# Patient Record
Sex: MALE | Race: WHITE
[De-identification: names, ages, dates, MRNs, and addresses within clinical notes are randomized per-mention and may not be internally consistent; named-entity substitution may affect disease eponyms.]

---

## 2018-04-25 ENCOUNTER — HOSPITAL ENCOUNTER (OUTPATIENT)
Dept: HOSPITAL 58 - OUTPT | Age: 78
End: 2018-04-25
Attending: OTOLARYNGOLOGY

## 2018-04-25 DIAGNOSIS — R42: Primary | ICD-10-CM

## 2018-04-30 ENCOUNTER — HOSPITAL ENCOUNTER (OUTPATIENT)
Dept: HOSPITAL 58 - RHC-LAB | Age: 78
Discharge: HOME | End: 2018-04-30
Attending: INTERNAL MEDICINE

## 2018-04-30 DIAGNOSIS — F33.1: ICD-10-CM

## 2018-04-30 DIAGNOSIS — E78.5: ICD-10-CM

## 2018-04-30 DIAGNOSIS — I10: ICD-10-CM

## 2018-04-30 DIAGNOSIS — E11.9: Primary | ICD-10-CM

## 2018-04-30 DIAGNOSIS — K21.9: ICD-10-CM

## 2018-04-30 PROCEDURE — 80061 LIPID PANEL: CPT

## 2018-04-30 PROCEDURE — 80053 COMPREHEN METABOLIC PANEL: CPT

## 2018-04-30 PROCEDURE — 84443 ASSAY THYROID STIM HORMONE: CPT

## 2018-04-30 PROCEDURE — 83036 HEMOGLOBIN GLYCOSYLATED A1C: CPT

## 2018-04-30 PROCEDURE — 85025 COMPLETE CBC W/AUTO DIFF WBC: CPT

## 2018-04-30 PROCEDURE — 36415 COLL VENOUS BLD VENIPUNCTURE: CPT

## 2018-05-15 ENCOUNTER — HOSPITAL ENCOUNTER (OUTPATIENT)
Dept: HOSPITAL 58 - FCC-LAB | Age: 78
Discharge: HOME | End: 2018-05-15
Attending: INTERNAL MEDICINE

## 2018-05-15 VITALS — BODY MASS INDEX: 20.7 KG/M2

## 2018-05-15 DIAGNOSIS — E87.5: ICD-10-CM

## 2018-05-15 DIAGNOSIS — E11.9: ICD-10-CM

## 2018-05-15 DIAGNOSIS — E78.5: Primary | ICD-10-CM

## 2018-05-15 DIAGNOSIS — D64.9: ICD-10-CM

## 2018-05-15 PROCEDURE — 83550 IRON BINDING TEST: CPT

## 2018-05-15 PROCEDURE — 80053 COMPREHEN METABOLIC PANEL: CPT

## 2018-05-15 PROCEDURE — 84443 ASSAY THYROID STIM HORMONE: CPT

## 2018-05-15 PROCEDURE — 82607 VITAMIN B-12: CPT

## 2018-05-15 PROCEDURE — 85045 AUTOMATED RETICULOCYTE COUNT: CPT

## 2018-05-15 PROCEDURE — 82746 ASSAY OF FOLIC ACID SERUM: CPT

## 2018-05-15 PROCEDURE — 84436 ASSAY OF TOTAL THYROXINE: CPT

## 2018-05-15 PROCEDURE — 84479 ASSAY OF THYROID (T3 OR T4): CPT

## 2018-05-15 PROCEDURE — 82728 ASSAY OF FERRITIN: CPT

## 2018-05-15 PROCEDURE — 36415 COLL VENOUS BLD VENIPUNCTURE: CPT

## 2018-05-15 PROCEDURE — 83540 ASSAY OF IRON: CPT

## 2018-05-15 PROCEDURE — 84466 ASSAY OF TRANSFERRIN: CPT

## 2018-05-15 PROCEDURE — 85025 COMPLETE CBC W/AUTO DIFF WBC: CPT

## 2018-05-16 ENCOUNTER — HOSPITAL ENCOUNTER (INPATIENT)
Dept: HOSPITAL 58 - MEDSURG B | Age: 78
LOS: 2 days | Discharge: HOME | DRG: 641 | End: 2018-05-18
Attending: INTERNAL MEDICINE | Admitting: INTERNAL MEDICINE

## 2018-05-16 VITALS — BODY MASS INDEX: 20.7 KG/M2

## 2018-05-16 DIAGNOSIS — D50.9: ICD-10-CM

## 2018-05-16 DIAGNOSIS — K86.1: ICD-10-CM

## 2018-05-16 DIAGNOSIS — R07.89: ICD-10-CM

## 2018-05-16 DIAGNOSIS — I10: ICD-10-CM

## 2018-05-16 DIAGNOSIS — Z87.891: ICD-10-CM

## 2018-05-16 DIAGNOSIS — E86.0: ICD-10-CM

## 2018-05-16 DIAGNOSIS — F41.9: ICD-10-CM

## 2018-05-16 DIAGNOSIS — N17.9: ICD-10-CM

## 2018-05-16 DIAGNOSIS — K21.9: ICD-10-CM

## 2018-05-16 DIAGNOSIS — Z90.49: ICD-10-CM

## 2018-05-16 DIAGNOSIS — Z79.84: ICD-10-CM

## 2018-05-16 DIAGNOSIS — E11.22: ICD-10-CM

## 2018-05-16 DIAGNOSIS — Z79.899: ICD-10-CM

## 2018-05-16 DIAGNOSIS — I12.9: ICD-10-CM

## 2018-05-16 DIAGNOSIS — E87.5: Primary | ICD-10-CM

## 2018-05-16 DIAGNOSIS — N18.9: ICD-10-CM

## 2018-05-16 PROCEDURE — 93005 ELECTROCARDIOGRAM TRACING: CPT

## 2018-05-16 PROCEDURE — 85025 COMPLETE CBC W/AUTO DIFF WBC: CPT

## 2018-05-16 PROCEDURE — 82550 ASSAY OF CK (CPK): CPT

## 2018-05-16 PROCEDURE — 99233 SBSQ HOSP IP/OBS HIGH 50: CPT

## 2018-05-16 PROCEDURE — 84484 ASSAY OF TROPONIN QUANT: CPT

## 2018-05-16 PROCEDURE — 93010 ELECTROCARDIOGRAM REPORT: CPT

## 2018-05-16 PROCEDURE — 80053 COMPREHEN METABOLIC PANEL: CPT

## 2018-05-16 PROCEDURE — 82962 GLUCOSE BLOOD TEST: CPT

## 2018-05-16 PROCEDURE — 99223 1ST HOSP IP/OBS HIGH 75: CPT

## 2018-05-16 PROCEDURE — 99239 HOSP IP/OBS DSCHRG MGMT >30: CPT

## 2018-05-16 PROCEDURE — 36415 COLL VENOUS BLD VENIPUNCTURE: CPT

## 2018-05-16 RX ADMIN — INSULIN GLARGINE SCH UNIT: 100 INJECTION, SOLUTION SUBCUTANEOUS at 21:12

## 2018-05-16 RX ADMIN — METOPROLOL TARTRATE SCH MG: 50 TABLET, FILM COATED ORAL at 21:08

## 2018-05-16 RX ADMIN — SODIUM CHLORIDE SCH MLS/HR: 0.9 INJECTION, SOLUTION INTRAVENOUS at 18:00

## 2018-05-17 RX ADMIN — INSULIN GLARGINE SCH UNIT: 100 INJECTION, SOLUTION SUBCUTANEOUS at 20:14

## 2018-05-17 RX ADMIN — LINAGLIPTIN SCH MG: 5 TABLET, FILM COATED ORAL at 09:25

## 2018-05-17 RX ADMIN — PAROXETINE HYDROCHLORIDE SCH MG: 20 TABLET, FILM COATED ORAL at 09:25

## 2018-05-17 RX ADMIN — METOPROLOL TARTRATE SCH MG: 50 TABLET, FILM COATED ORAL at 20:14

## 2018-05-17 RX ADMIN — MELOXICAM SCH MG: 7.5 TABLET ORAL at 09:26

## 2018-05-17 RX ADMIN — SODIUM CHLORIDE SCH MLS/HR: 0.9 INJECTION, SOLUTION INTRAVENOUS at 17:29

## 2018-05-17 RX ADMIN — LOVASTATIN SCH MG: 20 TABLET ORAL at 09:26

## 2018-05-17 RX ADMIN — PANTOPRAZOLE SODIUM SCH MG: 40 TABLET, DELAYED RELEASE ORAL at 05:43

## 2018-05-17 RX ADMIN — METOPROLOL TARTRATE SCH MG: 50 TABLET, FILM COATED ORAL at 09:26

## 2018-05-17 RX ADMIN — INSULIN GLARGINE SCH UNIT: 100 INJECTION, SOLUTION SUBCUTANEOUS at 09:32

## 2018-05-17 NOTE — DI
EXAM:  Chest one view 

  

HISTORY:  Coughing 

  

COMPARISON:  None 

  

TECHNIQUE:  Single view of the chest was performed 

  

FINDINGS:  No airspace consolidation.  Granulomatous calcification. Possible calcified pleural plaqui
ng.  There is no pleural effusion or pneumothorax.  The heart is normal in size.  The mediastinal con
tour is normal, noting atherosclerosis.  There are no acute abnormalities of the bones. 

  

IMPRESSION: 

1.  No acute cardiopulmonary process. 

2.  Possible calcified pleural plaquing.

## 2018-05-18 VITALS — SYSTOLIC BLOOD PRESSURE: 151 MMHG | DIASTOLIC BLOOD PRESSURE: 61 MMHG | TEMPERATURE: 97.9 F

## 2018-05-18 RX ADMIN — MELOXICAM SCH MG: 7.5 TABLET ORAL at 09:49

## 2018-05-18 RX ADMIN — METOPROLOL TARTRATE SCH MG: 50 TABLET, FILM COATED ORAL at 09:49

## 2018-05-18 RX ADMIN — LOVASTATIN SCH MG: 20 TABLET ORAL at 09:49

## 2018-05-18 RX ADMIN — PAROXETINE HYDROCHLORIDE SCH MG: 20 TABLET, FILM COATED ORAL at 09:50

## 2018-05-18 RX ADMIN — LINAGLIPTIN SCH MG: 5 TABLET, FILM COATED ORAL at 09:49

## 2018-05-18 RX ADMIN — PANTOPRAZOLE SODIUM SCH MG: 40 TABLET, DELAYED RELEASE ORAL at 05:48

## 2018-05-18 RX ADMIN — INSULIN GLARGINE SCH UNIT: 100 INJECTION, SOLUTION SUBCUTANEOUS at 09:56

## 2018-05-24 ENCOUNTER — HOSPITAL ENCOUNTER (OUTPATIENT)
Dept: HOSPITAL 58 - RHC-LAB | Age: 78
Discharge: HOME | End: 2018-05-24
Attending: INTERNAL MEDICINE

## 2018-05-24 VITALS — BODY MASS INDEX: 20.7 KG/M2

## 2018-05-24 DIAGNOSIS — D64.9: ICD-10-CM

## 2018-05-24 DIAGNOSIS — E78.5: Primary | ICD-10-CM

## 2018-05-24 DIAGNOSIS — I10: ICD-10-CM

## 2018-05-24 PROCEDURE — 85025 COMPLETE CBC W/AUTO DIFF WBC: CPT

## 2018-05-24 PROCEDURE — 80053 COMPREHEN METABOLIC PANEL: CPT

## 2018-05-24 PROCEDURE — 36415 COLL VENOUS BLD VENIPUNCTURE: CPT

## 2018-06-08 NOTE — PN
DATE OF SERVICE:  05/17/18



SUBJECTIVE: 

The patient is admitted with hyperkalemia which has resolved, most likely from 
hemodilution. He complains of the coughing, congestion, constipation and having 
some memory issues. Otherwise, no chest pain, PND, or orthopnea. 



REVIEW OF SYSTEMS: 

CONSTITUTIONAL: No fever, no chills.  

HEENT:  Normal.

ENDOCRINE: No weight gain, no weight loss.

CVS:  No angina symptoms. No CHF symptoms.  No palpitations.  No atypical chest 
pain for CAD.  No shortness of breath. No PND, no orthopnea. 

RESPIRATORY:  Cough and congestion. No hemoptysis. 

GI:  No nausea, no vomiting.  No abdominal pain. 

:  No hematuria. No polyuria. 

MUSCULOSKELETAL:  No joint swelling. 

PSYCHIATRIC: Not anxious. No depression. No suicidal thoughts. No homicidal 
thoughts. 

SKIN: Intact. No rash. 



PHYSICAL EXAMINATION:  

V/S: /72, respiratory rate 20, heart rate 63, temperature 97.8. 
Saturation 96%.

HEENT: Normocephalic, atraumatic. Mucosa dry. Pallor positive. No icterus. 

NECK:  Supple.  No JVD, no carotid bruit. No lymphadenopathy.

LUNGS:  Clear to auscultation. No rales or rhonchi. 

HEART:  S1, S2 normal. No S3. No murmur, gallop or regurgitation. 

ABDOMEN: Soft, nontender. Bowel sounds active. No rigidity. No rebound or 
guarding. No CVA tenderness. 

EXTREMITIES:   No cyanosis, clubbing or pedal edema.

MUSCULOSKELETAL:  No joint swelling. 

NEUROLOGIC: Awake, alert, oriented times three.  No focal deficit. 

LYMPHATIC: No lymph nodes palpable. 

SKIN: Intact.



LABS:

White count 10.17, hemoglobin 11.0, hematocrit 34.3. Platelet count is 333. 
Sodium 140, potassium 4.8, chloride 105, bicarb 26, BUN 22, creatinine 0.98, 
glucose 140. 



ASSESSMENT: 

1.  HYPERKALEMIA

2.  ACUTE ON CHRONIC RENAL FAILURE

3.  DIABETES, LABILE

4.  HISTORY OF PANCREATITIS

5.  HYPERTENSION



PLAN:

1.  CT scan of head

2.  Mini mental status

3.  Accu-Cheks with coverage

4.  Out of bed to chair

5.  Activity as tolerated



TIME SPENT: More than 35 minutes
MTDD

## 2018-08-28 ENCOUNTER — HOSPITAL ENCOUNTER (OUTPATIENT)
Dept: HOSPITAL 58 - RHC-LAB | Age: 78
Discharge: HOME | End: 2018-08-28
Attending: INTERNAL MEDICINE

## 2018-08-28 VITALS — BODY MASS INDEX: 20.7 KG/M2

## 2018-08-28 DIAGNOSIS — E11.9: Primary | ICD-10-CM

## 2018-08-28 DIAGNOSIS — I10: ICD-10-CM

## 2018-08-28 DIAGNOSIS — E78.5: ICD-10-CM

## 2018-08-28 PROCEDURE — 83036 HEMOGLOBIN GLYCOSYLATED A1C: CPT

## 2018-08-28 PROCEDURE — 85025 COMPLETE CBC W/AUTO DIFF WBC: CPT

## 2018-08-28 PROCEDURE — 36415 COLL VENOUS BLD VENIPUNCTURE: CPT

## 2018-08-28 PROCEDURE — 80061 LIPID PANEL: CPT

## 2018-08-28 PROCEDURE — 84443 ASSAY THYROID STIM HORMONE: CPT

## 2018-08-28 PROCEDURE — 80053 COMPREHEN METABOLIC PANEL: CPT
